# Patient Record
Sex: MALE | Race: BLACK OR AFRICAN AMERICAN | Employment: UNEMPLOYED | URBAN - METROPOLITAN AREA
[De-identification: names, ages, dates, MRNs, and addresses within clinical notes are randomized per-mention and may not be internally consistent; named-entity substitution may affect disease eponyms.]

---

## 2020-01-28 ENCOUNTER — APPOINTMENT (OUTPATIENT)
Dept: GENERAL RADIOLOGY | Facility: CLINIC | Age: 40
End: 2020-01-28
Attending: EMERGENCY MEDICINE
Payer: COMMERCIAL

## 2020-01-28 ENCOUNTER — HOSPITAL ENCOUNTER (EMERGENCY)
Facility: CLINIC | Age: 40
Discharge: HOME | End: 2020-01-29
Attending: EMERGENCY MEDICINE | Admitting: EMERGENCY MEDICINE
Payer: COMMERCIAL

## 2020-01-28 DIAGNOSIS — M62.830 LUMBAR PARASPINAL MUSCLE SPASM: ICD-10-CM

## 2020-01-28 DIAGNOSIS — M54.16 LUMBAR RADICULOPATHY: ICD-10-CM

## 2020-01-28 PROCEDURE — 72100 X-RAY EXAM L-S SPINE 2/3 VWS: CPT

## 2020-01-28 PROCEDURE — 99283 EMERGENCY DEPT VISIT LOW MDM: CPT

## 2020-01-28 RX ORDER — METHYLPREDNISOLONE 4 MG
TABLET, DOSE PACK ORAL
Qty: 21 TABLET | Refills: 0 | Status: SHIPPED | OUTPATIENT
Start: 2020-01-28

## 2020-01-28 RX ORDER — METHOCARBAMOL 500 MG/1
500 TABLET, FILM COATED ORAL EVERY 6 HOURS PRN
Qty: 12 TABLET | Refills: 0 | Status: SHIPPED | OUTPATIENT
Start: 2020-01-28

## 2020-01-28 ASSESSMENT — ENCOUNTER SYMPTOMS
HEADACHES: 0
NECK PAIN: 0
DIFFICULTY URINATING: 0
BACK PAIN: 1

## 2020-01-28 NOTE — ED AVS SNAPSHOT
[facility]  [address]  [facility] [address]  Phone:  [phone]  Fax:  [phone]                                    Pam Rivera   MRN: [id number]    Department:  [facility]   Date of Visit:  1/28/2020           After Visit Summary Signature Page    I have received my discharge instructions, and my questions have been answered. I have discussed any challenges I see with this plan with the nurse or doctor.    ..........................................................................................................................................  Patient/Patient Representative Signature      ..........................................................................................................................................  Patient Representative Print Name and Relationship to Patient    ..................................................               ................................................  Date                                   Time    ..........................................................................................................................................  Reviewed by Signature/Title    ...................................................              ..............................................  Date                                               Time          22EPIC Rev 08/18

## 2020-01-29 VITALS
SYSTOLIC BLOOD PRESSURE: 123 MMHG | OXYGEN SATURATION: 99 % | TEMPERATURE: 97.4 F | RESPIRATION RATE: 20 BRPM | HEART RATE: 58 BPM | DIASTOLIC BLOOD PRESSURE: 83 MMHG

## 2020-01-29 RX ORDER — IBUPROFEN 800 MG/1
800 TABLET, FILM COATED ORAL EVERY 8 HOURS PRN
Qty: 20 TABLET | Refills: 0 | Status: SHIPPED | OUTPATIENT
Start: 2020-01-29 | End: 2020-02-06

## 2020-01-29 NOTE — ED PROVIDER NOTES
History     Chief Complaint:  Back Pain    HPI  Pam Rivera is a 40 year old male who presents to the emergency department today for evaluation of back pain. The patient fell in the snow 2 weeks ago where he fell back landing on his buttocks and then his back. Since then he reports pain in his lower back that spreads down and anterior and posterior aspects of both legs. His gait is normal and he denies any numbness, weakness, urinary incontinence or difficulty urinating. The patient has been taking 600 mg of ibuprofen every 6 hours as well as Tylenol for pain control.     Allergies:  No known drug allergies    Medications:    The patient is not currently taking any prescribed medications.    Past Medical History:    The patient does not have any past pertinent medical history.    Past Surgical History:    History reviewed. No pertinent surgical history.    Family History:    History reviewed. No pertinent family history.     Social History:  The patient arrives with his wife  Smoking former quit 2013  Alcohol use no  Marital Status:      Review of Systems   Genitourinary: Negative for difficulty urinating and enuresis.   Musculoskeletal: Positive for back pain. Negative for gait problem and neck pain.   Neurological: Negative for headaches.   All other systems reviewed and are negative.    Physical Exam     Patient Vitals for the past 24 hrs:   BP Temp Temp src Pulse Resp SpO2   01/29/20 0041 123/83 97.4  F (36.3  C) Oral 58 20 99 %   01/29/20 0040 -- -- -- -- -- 98 %   01/29/20 0039 123/83 -- -- 72 -- --   01/28/20 2040 (!) 132/97 97.5  F (36.4  C) -- 70 16 100 %     Physical Exam  Nursing note and vitals reviewed.  Constitutional: Cooperative. Laying prone on the bed.  Cardiovascular: Normal rate, regular rhythm and normal heart sounds.  No murmur.  Pulmonary/Chest: Effort normal and breath sounds normal. No respiratory distress. No wheezes. No rales.   Abdominal: Soft. Normal appearance. There is no  tenderness.   Musculoskeletal: Normal range of motion of LEs.   Neurological: Alert. Normal strength in both lower extremities.   Skin: Skin is warm and dry.   Psychiatric: Normal mood and affect.     Emergency Department Course     Imaging:  Radiology findings were communicated with the patient who voiced understanding of the findings.    Lumbar spine XR 2-3 views  IMPRESSION: No fracture.  Normal vertebral heights and alignment.  Normal disc spaces and facets for age.  Normal extraspinal structures  Reading per radiology    Emergency Department Course:  Past medical records, nursing notes, and vitals reviewed.  2310: I performed an exam of the patient and obtained history, as documented above.     The patient was sent for a XR while in the emergency department, findings above    0019: I rechecked the patient. Explained findings to patient.    I personally reviewed the imaging results with the Patient and spouse and answered all related questions prior to discharge.     Findings and plan explained to the Patient. Patient discharged home with instructions regarding supportive care, medications, and reasons to return. The importance of close follow-up was reviewed.   Impression & Plan      Medical Decision Making:  Pam Rivera is a 40 year old male who presents to the emergency department today for evaluation of lumbar back pain with lumbar radiculopathy symptoms 2 weeks after a fall. Given the fall I did perform an XR which is negative for fracture or malalignment in the lumbar spine. His neurovascular exam in all extremities if normal. No concern for cauda equina syndrome, spinal abscess, or other emergent causes of lumbar back pain. Plan of care will be to continue ibuprofen with the addition of a muscle relaxant medrol dose pack, ice, rest and follow up with the spine clinic if not improving.     Diagnosis:    ICD-10-CM   1. Lumbar paraspinal muscle spasm M62.830   2. Lumbar radiculopathy M54.16        Disposition:   discharged to home    Discharge Medications:     Medication List      Started    methocarbamol 500 MG tablet  Commonly known as:  ROBAXIN  500 mg, Oral, EVERY 6 HOURS PRN     methylPREDNISolone 4 MG tablet therapy pack  Commonly known as:  MEDROL DOSEPAK  Follow Package Directions        Modified    * IBUPROFEN PO  What changed:  Another medication with the same name was added. Make sure you understand how and when to take each.     * ibuprofen 800 MG tablet  Commonly known as:  ADVIL/MOTRIN  800 mg, Oral, EVERY 8 HOURS PRN  What changed:  You were already taking a medication with the same name, and this prescription was added. Make sure you understand how and when to take each.         * This list has 2 medication(s) that are the same as other medications prescribed for you. Read the directions carefully, and ask your doctor or other care provider to review them with you.                Scribe Disclosure:  I, Minerva Ponce, am serving as a scribe at 11:01 PM on 1/28/2020 to document services personally performed by Frederick Liang MD based on my observations and the provider's statements to me.    [facility] EMERGENCY DEPARTMENT       Frederick Liang MD  01/29/20 0223

## 2020-01-29 NOTE — ED TRIAGE NOTES
Pt in with C/O fall two weeks ago. Pt reports he injured his back during slip and fall and pain is not improving. Pain located in lower back radiating to both legs. Pt took ibuprofen 1 hour PTA